# Patient Record
Sex: FEMALE | HISPANIC OR LATINO | URBAN - METROPOLITAN AREA
[De-identification: names, ages, dates, MRNs, and addresses within clinical notes are randomized per-mention and may not be internally consistent; named-entity substitution may affect disease eponyms.]

---

## 2024-04-15 ENCOUNTER — LAB (OUTPATIENT)
Dept: URBAN - METROPOLITAN AREA CLINIC 109 | Facility: CLINIC | Age: 49
End: 2024-04-15

## 2024-04-15 ENCOUNTER — OV NP (OUTPATIENT)
Dept: URBAN - METROPOLITAN AREA CLINIC 109 | Facility: CLINIC | Age: 49
End: 2024-04-15
Payer: COMMERCIAL

## 2024-04-15 VITALS
TEMPERATURE: 97.9 F | BODY MASS INDEX: 29.88 KG/M2 | DIASTOLIC BLOOD PRESSURE: 70 MMHG | HEART RATE: 78 BPM | WEIGHT: 148.2 LBS | HEIGHT: 59 IN | SYSTOLIC BLOOD PRESSURE: 117 MMHG

## 2024-04-15 DIAGNOSIS — R10.11 RIGHT UPPER QUADRANT ABDOMINAL PAIN: ICD-10-CM

## 2024-04-15 DIAGNOSIS — K76.9 LIVER LESION: ICD-10-CM

## 2024-04-15 PROCEDURE — 99204 OFFICE O/P NEW MOD 45 MIN: CPT | Performed by: INTERNAL MEDICINE

## 2024-04-15 RX ORDER — LEVOCETIRIZINE DIHYDROCHLORIDE 5 MG/1
1 TABLET IN THE EVENING TABLET ORAL ONCE A DAY
Status: ACTIVE | COMMUNITY

## 2024-04-15 NOTE — HPI-TODAY'S VISIT:
Pt is a 49 yo HF who presents today upon referral from pcp for evaluation of liver mass. A copy of this document will be sent to the referring provider.  Recent LFTs normal and US 3/2024 revealed "7.4 cm irregular appearing hyperechoic mass within super aspect of the anterior and posterior segment of right lobe of liver supsicious for large cavernous hemangioma."  Prior workup of lesion as follows. MRI abdomen w/ and w/out contrast 6/2022: "T2 hyperintense lobulated lesion with progressive discontinuous nodular peripheral enhancement measuring 8.2 x 6.6 cm. Compatible with a hemangioma in the right lower liver, corresponding to the right hepatic mass characterized on CT stone protocol April 21,2022."     Of note, was referred to general surgery since imaging at that time showed cystic focus adjacent to CBD and cystic duct measuring up to 1.1 cm, suspicious for a choledochal cyst. Reassured by Sydni Domingo no intervention was needed.   Occasional RUQ pain doug. in past two weeks. High fat diet, pain worst if eating late. No N/V/D/C, fever/chills, jaundice, pruritis, worsening fatigue, or changes in appetite or weight. No FHX HCC. Denies ETOH or tobacco use.

## 2024-04-15 NOTE — PHYSICAL EXAM CHEST:
Thorax symmetric, no deformities, breathing unlabored, normal breath sounds bilaterally b/l leg swelling x few days

## 2024-04-15 NOTE — PHYSICAL EXAM GASTROINTESTINAL
Abdomen: soft and nondistended, no visible masses, TTP of RUQ, no guarding or rigidity, no rebound tenderness, no palpable masses, normal bowel sounds. Positive sharma's sign. Liver and Spleen: no hepatosplenomegaly, liver nontender. Rectal: Deferred

## 2024-04-16 LAB
AFP, SERUM, TUMOR MARKER: 3.1
INR: 1
PT: 10.3